# Patient Record
Sex: MALE | Race: WHITE | NOT HISPANIC OR LATINO | URBAN - METROPOLITAN AREA
[De-identification: names, ages, dates, MRNs, and addresses within clinical notes are randomized per-mention and may not be internally consistent; named-entity substitution may affect disease eponyms.]

---

## 2024-02-12 ENCOUNTER — APPOINTMENT (RX ONLY)
Dept: URBAN - METROPOLITAN AREA CLINIC 159 | Facility: CLINIC | Age: 59
Setting detail: DERMATOLOGY
End: 2024-02-12

## 2024-02-12 DIAGNOSIS — D485 NEOPLASM OF UNCERTAIN BEHAVIOR OF SKIN: ICD-10-CM

## 2024-02-12 PROBLEM — D48.5 NEOPLASM OF UNCERTAIN BEHAVIOR OF SKIN: Status: ACTIVE | Noted: 2024-02-12

## 2024-02-12 PROCEDURE — ? COUNSELING

## 2024-02-12 PROCEDURE — 11900 INJECT SKIN LESIONS </W 7: CPT

## 2024-02-12 PROCEDURE — ? INTRALESIONAL KENALOG

## 2024-02-12 ASSESSMENT — LOCATION ZONE DERM: LOCATION ZONE: FACE

## 2024-02-12 ASSESSMENT — LOCATION SIMPLE DESCRIPTION DERM: LOCATION SIMPLE: RIGHT CHEEK

## 2024-02-12 ASSESSMENT — LOCATION DETAILED DESCRIPTION DERM: LOCATION DETAILED: RIGHT SUPERIOR PREAURICULAR CHEEK

## 2024-02-12 NOTE — PROCEDURE: COUNSELING
Patient Specific Counseling (Will Not Stick From Patient To Patient): =============2/12/24 \\n-Pt has painful area on right preauricular cheek x 2 days anterior to the tragus approximately. Pt has previously had site treated with ILK 0.1cc of K2.5 here in our office, which pt states completely alleviated pain/inflammation since tx in 2021. He would like to repeat that treatment. \\n-Pt has seen multiple drs in specialties (ENT, TMJ specialists) to ensure no other findings; an xray confirmed cyst in 2021. Pt states the site is causing pressure. Pt currently taking IB Profen and tramadol, which have not helped\\n-Exam findings - did not palpate obvious subcutaneous nodule. Possible very small EIC in the area, there is one dilated hair follicle in the area that he identifies\\n-given his good response to tx in past, I explained it is reasonable to repeat that treatment empirically. I explained that the degree of tenderness he is reporting seems beyond that seen with an EIC that is not especially inflamed (I don't see much inflammation on exam today). \\n-Discussed location at junction on jaw/preauricular, recommending re-consulting ENT for deeper lying problem (TMJ issue, neoplasm, and other dxs outside of my expertise)\\n-Pt quit smokeless tobacco when this site presented the first time 3 years ago. Pt discontinued another form of smokeless tobacco 4 days ago, then this presented 2 days ago. I advised especially given his tobacco history, he needs to be evaluation by ENT/Head neck surgeon to further investigate the issue. He and wife agree and understand. \\n-Provided contact for ENT head neck surgeon Dr. Ronny Renee at [facility] in [place] [employer]. They will get in touch with his office. They know to alert me if they have any trouble getting in to see him, and they understand the need for further assessment of this issue.
Detail Level: Detailed

## 2024-02-12 NOTE — PROCEDURE: INTRALESIONAL KENALOG
Total Volume (Ccs): 0.1
X Size Of Lesion In Cm (Optional): 0
Ndc# For Kenalog Only: 4800-5266-70
Require Ndc Code?: No
Show Inventory Tab: Hide
Expiration Date For Kenalog (Optional): 12/25
Concentration Of Kenalog Solution Injected (Mg/Ml): 2.5
Administered By (Optional): dr. Garza
Lot # For Kenalog (Optional): [id number]
Detail Level: Detailed
Validate Note Data When Using Inventory: Yes
Medical Necessity Clause: This procedure was medically necessary because the lesions that were treated were:
Kenalog Type Of Vial: Multiple Dose
Kenalog Preparation: Kenalog
Consent: The risks of atrophy were reviewed with the patient.

## 2024-02-12 NOTE — HPI: EVALUATION OF SKIN LESION(S)
Hpi Title: Evaluation of a Skin Lesion
Additional History: \\n-Pt has painful cyst on right temple/cheek x 2 days. Pt has previously had site treated with ILK 0.1cc of K2.5, which pt states completely alleviated pain/inflammation since tx in 2021. Pt has seen multiple drs in specialties to ensure no other findings. Pt states the site is causing pressure on ear and nerves. Pt currently taking IB Profen and tramadol, which have not helped